# Patient Record
Sex: FEMALE | Race: WHITE | NOT HISPANIC OR LATINO | Employment: PART TIME | URBAN - METROPOLITAN AREA
[De-identification: names, ages, dates, MRNs, and addresses within clinical notes are randomized per-mention and may not be internally consistent; named-entity substitution may affect disease eponyms.]

---

## 2017-03-24 ENCOUNTER — TELEPHONE (OUTPATIENT)
Dept: GASTROENTEROLOGY | Facility: CLINIC | Age: 67
End: 2017-03-24

## 2017-03-24 RX ORDER — OMEPRAZOLE 20 MG/1
CAPSULE, DELAYED RELEASE ORAL
Qty: 90 CAPSULE | Refills: 0 | Status: SHIPPED | OUTPATIENT
Start: 2017-03-24

## 2017-03-24 NOTE — TELEPHONE ENCOUNTER
----- Message from Rosario Torres sent at 3/24/2017  1:51 PM EDT -----  Regarding: Non-Urgent Medical Question  Contact: [phone]  We have moved to [place], [place] and here is our new address:    Rosario Torres  [address] [place] [address]   [id number]    We would like to keep My Chart on our computers to be able to refer back to things.  Would that be possible?   We are having a very difficult time finding a doctor in [place].  Most of them are not taking new patients and we can't find anyone who accepts [employer] insurance.  Do you have any suggestions?    Thank you,    Rosario

## 2017-03-24 NOTE — TELEPHONE ENCOUNTER
Called pt and advised that the [employer] is a website and she should be able to log into this site as long as she has her passwords.  Also recommended to pt that she call [employer] to see what [facility] will accept her insurance.  Pt verb uderstanding.